# Patient Record
Sex: FEMALE | Race: WHITE | NOT HISPANIC OR LATINO | Employment: OTHER | URBAN - METROPOLITAN AREA
[De-identification: names, ages, dates, MRNs, and addresses within clinical notes are randomized per-mention and may not be internally consistent; named-entity substitution may affect disease eponyms.]

---

## 2017-03-29 ENCOUNTER — PREPPED CHART (OUTPATIENT)
Dept: URBAN - METROPOLITAN AREA CLINIC 43 | Facility: CLINIC | Age: 82
End: 2017-03-29

## 2018-04-19 ENCOUNTER — ESTABLISHED COMPREHENSIVE EXAM (OUTPATIENT)
Dept: URBAN - METROPOLITAN AREA CLINIC 43 | Facility: CLINIC | Age: 83
End: 2018-04-19

## 2018-04-19 DIAGNOSIS — H04.123: ICD-10-CM

## 2018-04-19 DIAGNOSIS — H40.023: ICD-10-CM

## 2018-04-19 DIAGNOSIS — Z96.1: ICD-10-CM

## 2018-04-19 DIAGNOSIS — H43.813: ICD-10-CM

## 2018-04-19 PROCEDURE — 92133 CPTRZD OPH DX IMG PST SGM ON: CPT

## 2018-04-19 PROCEDURE — G8427 DOCREV CUR MEDS BY ELIG CLIN: HCPCS

## 2018-04-19 PROCEDURE — 92015 DETERMINE REFRACTIVE STATE: CPT

## 2018-04-19 PROCEDURE — 92014 COMPRE OPH EXAM EST PT 1/>: CPT

## 2018-04-19 PROCEDURE — G8785 BP SCRN NO PERF AT INTERVAL: HCPCS

## 2018-04-19 PROCEDURE — 1036F TOBACCO NON-USER: CPT

## 2018-04-19 ASSESSMENT — VISUAL ACUITY
OS_CC: J1+
OD_CC: 20/30
OD_CC: J1+
OS_SC: J8
OS_CC: 20/30+2
OS_SC: 20/30-2
OD_SC: J8
OD_SC: 20/40-1

## 2018-04-19 ASSESSMENT — TONOMETRY
OS_IOP_MMHG: 20
OD_IOP_MMHG: 20

## 2018-04-30 ENCOUNTER — IOP CHECK (OUTPATIENT)
Dept: URBAN - METROPOLITAN AREA CLINIC 43 | Facility: CLINIC | Age: 83
End: 2018-04-30

## 2018-04-30 DIAGNOSIS — H40.013: ICD-10-CM

## 2018-04-30 PROCEDURE — 92012 INTRM OPH EXAM EST PATIENT: CPT

## 2018-04-30 PROCEDURE — G8427 DOCREV CUR MEDS BY ELIG CLIN: HCPCS

## 2018-04-30 PROCEDURE — G8785 BP SCRN NO PERF AT INTERVAL: HCPCS

## 2018-04-30 PROCEDURE — 92083 EXTENDED VISUAL FIELD XM: CPT

## 2018-04-30 PROCEDURE — 1036F TOBACCO NON-USER: CPT

## 2018-04-30 ASSESSMENT — TONOMETRY
OS_IOP_MMHG: 21
OD_IOP_MMHG: 21

## 2018-04-30 ASSESSMENT — VISUAL ACUITY
OD_CC: 20/30+1
OS_CC: 20/30+2

## 2018-08-31 NOTE — PATIENT DISCUSSION
New Prescription: Maxitrol (neomycin-polymyxin-dexameth): drops,suspension: 3.5-10,000-0.1 mg/mL-unit/mL-% 1 drop three times a day as directed into left eye 08-

## 2019-03-21 ENCOUNTER — EST. PATIENT EMERGENCY (OUTPATIENT)
Dept: URBAN - METROPOLITAN AREA CLINIC 36 | Facility: CLINIC | Age: 84
End: 2019-03-21

## 2019-03-21 DIAGNOSIS — H10.502: ICD-10-CM

## 2019-03-21 PROCEDURE — 92012 INTRM OPH EXAM EST PATIENT: CPT

## 2019-03-21 ASSESSMENT — VISUAL ACUITY
OD_PH: 20/30-2
OD_SC: 20/50
OS_PH: 20/40+2
OS_SC: 20/60+2

## 2019-03-21 ASSESSMENT — TONOMETRY
OS_IOP_MMHG: 20
OD_IOP_MMHG: 20

## 2019-05-02 ENCOUNTER — ESTABLISHED COMPREHENSIVE EXAM (OUTPATIENT)
Dept: URBAN - METROPOLITAN AREA CLINIC 36 | Facility: CLINIC | Age: 84
End: 2019-05-02

## 2019-05-02 DIAGNOSIS — H40.013: ICD-10-CM

## 2019-05-02 DIAGNOSIS — H04.123: ICD-10-CM

## 2019-05-02 DIAGNOSIS — H52.7: ICD-10-CM

## 2019-05-02 PROCEDURE — 92014 COMPRE OPH EXAM EST PT 1/>: CPT

## 2019-05-02 PROCEDURE — 92133 CPTRZD OPH DX IMG PST SGM ON: CPT

## 2019-05-02 PROCEDURE — 92015 DETERMINE REFRACTIVE STATE: CPT

## 2019-05-02 ASSESSMENT — VISUAL ACUITY
OS_CC: 20/25+2
OD_CC: 20/20-2
OD_CC: J1
OS_SC: J6
OD_SC: 20/50+2
OS_CC: J1
OS_SC: 20/40-1
OD_SC: J6

## 2019-05-02 ASSESSMENT — TONOMETRY
OS_IOP_MMHG: 19
OD_IOP_MMHG: 19

## 2020-02-20 ENCOUNTER — ESTABLISHED COMPREHENSIVE EXAM (OUTPATIENT)
Dept: URBAN - METROPOLITAN AREA CLINIC 36 | Facility: CLINIC | Age: 85
End: 2020-02-20

## 2020-02-20 DIAGNOSIS — H01.006: ICD-10-CM

## 2020-02-20 DIAGNOSIS — H01.003: ICD-10-CM

## 2020-02-20 DIAGNOSIS — H52.7: ICD-10-CM

## 2020-02-20 DIAGNOSIS — H04.123: ICD-10-CM

## 2020-02-20 DIAGNOSIS — H40.013: ICD-10-CM

## 2020-02-20 DIAGNOSIS — H43.813: ICD-10-CM

## 2020-02-20 DIAGNOSIS — H18.51: ICD-10-CM

## 2020-02-20 PROCEDURE — 92014 COMPRE OPH EXAM EST PT 1/>: CPT

## 2020-02-20 PROCEDURE — 92133 CPTRZD OPH DX IMG PST SGM ON: CPT

## 2020-02-20 PROCEDURE — 92015 DETERMINE REFRACTIVE STATE: CPT

## 2020-02-20 ASSESSMENT — TONOMETRY
OD_IOP_MMHG: 19
OS_IOP_MMHG: 18

## 2020-02-20 ASSESSMENT — VISUAL ACUITY
OS_SC: 20/40+2
OD_SC: 20/30-1
OS_CC: J2
OD_CC: J1
OD_CC: 20/20-1
OS_CC: 20/30
OD_SC: J12
OS_SC: J12

## 2021-02-24 ENCOUNTER — ESTABLISHED COMPREHENSIVE EXAM (OUTPATIENT)
Dept: URBAN - METROPOLITAN AREA CLINIC 36 | Facility: CLINIC | Age: 86
End: 2021-02-24

## 2021-02-24 DIAGNOSIS — H43.813: ICD-10-CM

## 2021-02-24 DIAGNOSIS — H04.123: ICD-10-CM

## 2021-02-24 DIAGNOSIS — H18.519: ICD-10-CM

## 2021-02-24 DIAGNOSIS — H52.4: ICD-10-CM

## 2021-02-24 DIAGNOSIS — H52.7: ICD-10-CM

## 2021-02-24 DIAGNOSIS — H01.003: ICD-10-CM

## 2021-02-24 DIAGNOSIS — H40.013: ICD-10-CM

## 2021-02-24 DIAGNOSIS — H10.502: ICD-10-CM

## 2021-02-24 DIAGNOSIS — H01.006: ICD-10-CM

## 2021-02-24 PROCEDURE — 92133 CPTRZD OPH DX IMG PST SGM ON: CPT

## 2021-02-24 PROCEDURE — 92014 COMPRE OPH EXAM EST PT 1/>: CPT

## 2021-02-24 PROCEDURE — 92015 DETERMINE REFRACTIVE STATE: CPT

## 2021-02-24 ASSESSMENT — VISUAL ACUITY
OD_SC: 20/50-2
OS_CC: J1
OS_SC: J10
OD_CC: J2
OD_SC: J8
OS_SC: 20/60
OD_CC: 20/25
OS_CC: 20/50+1

## 2021-02-24 ASSESSMENT — TONOMETRY
OD_IOP_MMHG: 20
OS_IOP_MMHG: 22

## 2022-07-19 ENCOUNTER — COMPREHENSIVE EXAM (OUTPATIENT)
Dept: URBAN - METROPOLITAN AREA CLINIC 36 | Facility: CLINIC | Age: 87
End: 2022-07-19

## 2022-07-19 DIAGNOSIS — H04.123: ICD-10-CM

## 2022-07-19 DIAGNOSIS — H10.502: ICD-10-CM

## 2022-07-19 DIAGNOSIS — H18.513: ICD-10-CM

## 2022-07-19 DIAGNOSIS — H43.813: ICD-10-CM

## 2022-07-19 DIAGNOSIS — H52.7: ICD-10-CM

## 2022-07-19 DIAGNOSIS — H40.013: ICD-10-CM

## 2022-07-19 DIAGNOSIS — H01.003: ICD-10-CM

## 2022-07-19 DIAGNOSIS — H01.006: ICD-10-CM

## 2022-07-19 DIAGNOSIS — Z96.1: ICD-10-CM

## 2022-07-19 PROCEDURE — 92014 COMPRE OPH EXAM EST PT 1/>: CPT

## 2022-07-19 PROCEDURE — 92015 DETERMINE REFRACTIVE STATE: CPT

## 2022-07-19 ASSESSMENT — VISUAL ACUITY
OS_SC: 20/70-1
OU_CC: J1+
OD_SC: J10
OU_CC: 20/25
OD_CC: J1
OS_CC: 20/30-2
OD_SC: 20/70-2
OS_PH: 20/25-1
OS_SC: J10
OS_CC: J1
OU_SC: J8
OU_SC: 20/70+1
OD_CC: 20/25-1

## 2022-07-19 ASSESSMENT — TONOMETRY
OS_IOP_MMHG: 20
OD_IOP_MMHG: 20

## 2024-06-27 ENCOUNTER — COMPREHENSIVE EXAM (OUTPATIENT)
Dept: URBAN - METROPOLITAN AREA CLINIC 36 | Facility: CLINIC | Age: 89
End: 2024-06-27

## 2024-06-27 DIAGNOSIS — H01.006: ICD-10-CM

## 2024-06-27 DIAGNOSIS — H01.003: ICD-10-CM

## 2024-06-27 DIAGNOSIS — H04.123: ICD-10-CM

## 2024-06-27 DIAGNOSIS — H40.013: ICD-10-CM

## 2024-06-27 DIAGNOSIS — H43.813: ICD-10-CM

## 2024-06-27 DIAGNOSIS — H18.513: ICD-10-CM

## 2024-06-27 DIAGNOSIS — H52.7: ICD-10-CM

## 2024-06-27 PROCEDURE — 92015 DETERMINE REFRACTIVE STATE: CPT

## 2024-06-27 PROCEDURE — 92014 COMPRE OPH EXAM EST PT 1/>: CPT

## 2024-06-27 ASSESSMENT — TONOMETRY
OS_IOP_MMHG: 20
OD_IOP_MMHG: 19

## 2024-06-27 ASSESSMENT — VISUAL ACUITY
OD_CC: J2
OD_SC: J8
OS_SC: 20/60-1
OS_CC: 20/30-1
OS_SC: J12
OD_SC: 20/60
OD_CC: 20/30+2
OS_CC: J3